# Patient Record
Sex: FEMALE
[De-identification: names, ages, dates, MRNs, and addresses within clinical notes are randomized per-mention and may not be internally consistent; named-entity substitution may affect disease eponyms.]

---

## 2022-07-19 ENCOUNTER — APPOINTMENT (OUTPATIENT)
Dept: ORTHOPEDIC SURGERY | Facility: CLINIC | Age: 82
End: 2022-07-19

## 2022-07-19 VITALS — BODY MASS INDEX: 23.78 KG/M2 | HEIGHT: 66 IN | WEIGHT: 148 LBS

## 2022-07-19 DIAGNOSIS — M17.12 UNILATERAL PRIMARY OSTEOARTHRITIS, LEFT KNEE: ICD-10-CM

## 2022-07-19 DIAGNOSIS — Z78.9 OTHER SPECIFIED HEALTH STATUS: ICD-10-CM

## 2022-07-19 DIAGNOSIS — E11.9 TYPE 2 DIABETES MELLITUS W/OUT COMPLICATIONS: ICD-10-CM

## 2022-07-19 DIAGNOSIS — M17.11 UNILATERAL PRIMARY OSTEOARTHRITIS, RIGHT KNEE: ICD-10-CM

## 2022-07-19 PROBLEM — Z00.00 ENCOUNTER FOR PREVENTIVE HEALTH EXAMINATION: Status: ACTIVE | Noted: 2022-07-19

## 2022-07-19 PROCEDURE — 20610 DRAIN/INJ JOINT/BURSA W/O US: CPT | Mod: 50

## 2022-07-19 PROCEDURE — 99203 OFFICE O/P NEW LOW 30 MIN: CPT | Mod: 25

## 2022-07-19 PROCEDURE — 73562 X-RAY EXAM OF KNEE 3: CPT | Mod: 50

## 2022-07-19 RX ORDER — DORZOLAMIDE HYDROCHLORIDE 20 MG/ML
2 SOLUTION OPHTHALMIC
Qty: 30 | Refills: 0 | Status: ACTIVE | COMMUNITY
Start: 2022-06-29

## 2022-07-19 RX ORDER — BIMATOPROST 0.1 MG/ML
0.01 SOLUTION/ DROPS OPHTHALMIC
Qty: 5 | Refills: 0 | Status: ACTIVE | COMMUNITY
Start: 2022-03-11

## 2022-07-19 RX ORDER — PREDNISOLONE ACETATE 10 MG/ML
1 SUSPENSION/ DROPS OPHTHALMIC
Qty: 5 | Refills: 0 | Status: ACTIVE | COMMUNITY
Start: 2022-06-08

## 2022-07-19 RX ORDER — BROMFENAC 0.76 MG/ML
0.07 SOLUTION/ DROPS OPHTHALMIC
Qty: 5 | Refills: 0 | Status: ACTIVE | COMMUNITY
Start: 2022-06-09

## 2022-07-19 RX ORDER — BRIMONIDINE TARTRATE 2 MG/MG
0.2 SOLUTION/ DROPS OPHTHALMIC
Qty: 30 | Refills: 0 | Status: ACTIVE | COMMUNITY
Start: 2022-03-15

## 2022-07-19 RX ORDER — PILOCARPINE HYDROCHLORIDE OPHTHALMIC SOLUTION 10 MG/ML
1 SOLUTION/ DROPS OPHTHALMIC
Qty: 15 | Refills: 0 | Status: ACTIVE | COMMUNITY
Start: 2022-02-17

## 2022-07-19 RX ORDER — PILOCARPINE HYDROCHLORIDE OPHTHALMIC SOLUTION 20 MG/ML
2 SOLUTION/ DROPS OPHTHALMIC
Qty: 15 | Refills: 0 | Status: ACTIVE | COMMUNITY
Start: 2022-03-17

## 2022-07-19 RX ORDER — ACETAZOLAMIDE 500 MG/1
500 CAPSULE ORAL
Qty: 60 | Refills: 0 | Status: ACTIVE | COMMUNITY
Start: 2022-01-25

## 2022-07-19 RX ORDER — BESIFLOXACIN 6 MG/ML
0.6 SUSPENSION OPHTHALMIC
Qty: 5 | Refills: 0 | Status: ACTIVE | COMMUNITY
Start: 2022-06-01

## 2022-07-19 RX ORDER — TROPICAMIDE 10 MG/ML
1 SOLUTION/ DROPS OPHTHALMIC
Qty: 15 | Refills: 0 | Status: ACTIVE | COMMUNITY
Start: 2022-06-08

## 2022-07-19 RX ORDER — DORZOLAMIDE HYDROCHLORIDE TIMOLOL MALEATE 20; 5 MG/ML; MG/ML
22.3-6.8 SOLUTION/ DROPS OPHTHALMIC
Qty: 10 | Refills: 0 | Status: ACTIVE | COMMUNITY
Start: 2022-07-01

## 2022-07-20 NOTE — IMAGING
[Bilateral] : knee bilaterally [advanced tricompartmental OA with medial compartment narrowing and varus alignment] : advanced tricompartmental OA with medial compartment narrowing and varus alignment [de-identified] : Bilateral knee exam: \par \par General: no distress, no ligamentous laxity\par Skin: no significant pertinent finding\par Inspection: \par    Effusion: min\par    Malalignment: varus\par    Swelling: (-)\par    Quad atrophy: (-)\par    J-sign: (-)\par ROM: \par    0 - 135 degrees of flexion.\par Tenderness: \par    MJLT: +\par    LJLT: +\par    Medial patellar facet tenderness: +\par    Lateral patellar facet tenderness: +\par    Crepitus: +\par    Patellar grind tenderness: +)\par    Patellar tendon: (-)\par Strength: 5/5 Q/H/TA/GS/EHL\par Neuro: In tact to light touch throughout, DTR's wnl\par Vascularity: Extremity warm and well perfused\par Gait: mildly antalgic\par \par

## 2022-07-20 NOTE — DISCUSSION/SUMMARY
[de-identified] : discused options. bilateral knee csi today. will submit for bilateral kne euflexxa. \par \par The patient was advised of the diagnosis.  The natural history of the pathology was explained in full. All questions were answered.  The risks and benefits of conservative and interventional treatment alternatives were explained to the patient\par \par  Large joint corticosteroid injection given:right knee\par \par Patient indicated for injection after trial of rest, OTC medications including aspirin, Ibuprofen, Aleve etc or prescription NSAIDS, and/or exercises at home and/ or physical therapy without satisfactory response.  Patient has symptoms including pain, swelling, and/or decreased mobility in the joint. The risks, benefits, and alternatives to corticosteroid injection were explained in full to the patient, including but not limited to infection, sepsis, bleeding, scarring, skin discoloration, temporary increase in pain, syncopal episode, failure to resolve symptoms, allergic reaction, symptom recurrence, and elevation of blood sugar in diabetics. Patient understood the risks. All questions were answered. After discussion of options, patient requested an injection. \par \par Oral informed consent was obtained and sterile technique was utilized for the procedure including the preparation of the solutions used for the injection and betadine followed by alcohol prep to the injection site. Anesthesia was given with ethyl chloride sprayed topically. The injection was delivered. Patient tolerated the procedure well. \par \par Post Procedure Instructions: Patient was advised to call if redness, pain, or fever occur and apply ice for 15 min on and 15 min off later today\par \par Medications delivered: Depomedrol: 40 mg, Lidocaine: 4 cc\par \par Large joint corticosteroid injection given: left knee\par \par Patient indicated for injection after trial of rest, OTC medications including aspirin, Ibuprofen, Aleve etc or prescription NSAIDS, and/or exercises at home and/ or physical therapy without satisfactory response.  Patient has symptoms including pain, swelling, and/or decreased mobility in the joint. The risks, benefits, and alternatives to corticosteroid injection were explained in full to the patient, including but not limited to infection, sepsis, bleeding, scarring, skin discoloration, temporary increase in pain, syncopal episode, failure to resolve symptoms, allergic reaction, symptom recurrence, and elevation of blood sugar in diabetics. Patient understood the risks. All questions were answered. After discussion of options, patient requested an injection. \par \par Oral informed consent was obtained and sterile technique was utilized for the procedure including the preparation of the solutions used for the injection and betadine followed by alcohol prep to the injection site. Anesthesia was given with ethyl chloride sprayed topically. The injection was delivered. Patient tolerated the procedure well. \par \par Post Procedure Instructions: Patient was advised to call if redness, pain, or fever occur and apply ice for 15 min on and 15 min off later today\par \par Medications delivered: Depomedrol: 40 mg, Lidocaine: 4 cc\par

## 2022-07-20 NOTE — HISTORY OF PRESENT ILLNESS
[Left Leg] : left leg [Right Leg] : right leg [Gradual] : gradual [10] : 10 [7] : 7 [Sharp] : sharp [Constant] : constant [Walking] : walking [de-identified] : This is Ms. SAV GARCIA  a 82 year old female who comes in today complaining of longstanding bilateral L>R knee pain.  She was seen at an urgent care in [place] in March and had xrays done. Motrin and topical creams to mild relief. Admits to increasing pain and difficulty with prolonged walking and stairs. Admits to occasionally feeling and hearing "cracking" when she walks.  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

## 2022-07-25 RX ORDER — HYALURONATE SODIUM 20 MG/2 ML
20 SYRINGE (ML) INTRAARTICULAR
Qty: 6 | Refills: 0 | Status: ACTIVE | COMMUNITY
Start: 2022-07-25 | End: 1900-01-01

## 2022-09-06 ENCOUNTER — NON-APPOINTMENT (OUTPATIENT)
Age: 82
End: 2022-09-06

## 2022-11-30 ENCOUNTER — OFFICE (OUTPATIENT)
Dept: URBAN - METROPOLITAN AREA CLINIC 109 | Facility: CLINIC | Age: 82
Setting detail: OPHTHALMOLOGY
End: 2022-11-30
Payer: MEDICARE

## 2022-11-30 DIAGNOSIS — H40.1133: ICD-10-CM

## 2022-11-30 DIAGNOSIS — H34.8320: ICD-10-CM

## 2022-11-30 PROCEDURE — 99213 OFFICE O/P EST LOW 20 MIN: CPT | Performed by: OPHTHALMOLOGY

## 2022-11-30 ASSESSMENT — CONFRONTATIONAL VISUAL FIELD TEST (CVF)
OS_FINDINGS: FULL
OD_FINDINGS: FULL

## 2022-11-30 ASSESSMENT — REFRACTION_MANIFEST
OD_SPHERE: +2.75
OD_AXIS: 90
OS_ADD: +3.00
OD_CYLINDER: -0.75
OD_SPHERE: PLANO
OD_CYLINDER: -1.00
OD_VA1: 20/NI
OD_ADD: +3.00
OS_SPHERE: +0.25
OD_AXIS: 82

## 2022-11-30 ASSESSMENT — REFRACTION_AUTOREFRACTION
OS_SPHERE: -0.75
OS_CYLINDER: -1.00
OS_AXIS: 060
OD_AXIS: 094
OD_SPHERE: PL
OD_CYLINDER: -0.50

## 2022-11-30 ASSESSMENT — REFRACTION_CURRENTRX
OS_OVR_VA: 20/
OS_SPHERE: +2.75
OS_ADD: +2.75
OD_SPHERE: +2.75
OD_ADD: +2.75
OD_OVR_VA: 20/
OS_AXIS: 83
OD_AXIS: 82
OS_CYLINDER: -0.50
OD_CYLINDER: -0.75

## 2022-11-30 ASSESSMENT — SPHEQUIV_DERIVED
OS_SPHEQUIV: -1.25
OD_SPHEQUIV: 2.375

## 2022-11-30 ASSESSMENT — TONOMETRY
OD_IOP_MMHG: 20
OS_IOP_MMHG: 13

## 2022-11-30 ASSESSMENT — VISUAL ACUITY
OD_BCVA: 20/50-1
OS_BCVA: 20/300

## 2023-02-27 ENCOUNTER — RX ONLY (RX ONLY)
Age: 83
End: 2023-02-27

## 2023-02-27 ENCOUNTER — OFFICE (OUTPATIENT)
Dept: URBAN - METROPOLITAN AREA CLINIC 109 | Facility: CLINIC | Age: 83
Setting detail: OPHTHALMOLOGY
End: 2023-02-27
Payer: MEDICARE

## 2023-02-27 DIAGNOSIS — H40.1133: ICD-10-CM

## 2023-02-27 DIAGNOSIS — H34.8320: ICD-10-CM

## 2023-02-27 PROCEDURE — 99214 OFFICE O/P EST MOD 30 MIN: CPT | Performed by: OPHTHALMOLOGY

## 2023-02-27 ASSESSMENT — KERATOMETRY
OS_K2POWER_DIOPTERS: 45.87
OD_K2POWER_DIOPTERS: 45.00
OD_K1POWER_DIOPTERS: 44.62
OS_K1POWER_DIOPTERS: 44.25
OS_AXISANGLE_DEGREES: 118
OD_AXISANGLE_DEGREES: 66

## 2023-02-27 ASSESSMENT — REFRACTION_MANIFEST
OD_ADD: +3.00
OS_SPHERE: +0.25
OD_CYLINDER: -1.00
OD_CYLINDER: -0.75
OD_SPHERE: +2.75
OD_AXIS: 82
OD_AXIS: 90
OS_ADD: +3.00
OD_VA1: 20/NI
OD_SPHERE: PLANO

## 2023-02-27 ASSESSMENT — REFRACTION_CURRENTRX
OD_OVR_VA: 20/
OS_CYLINDER: -0.50
OS_AXIS: 83
OD_AXIS: 82
OS_ADD: +2.75
OD_ADD: +2.75
OS_SPHERE: +2.75
OD_SPHERE: +2.75
OD_CYLINDER: -0.75
OS_OVR_VA: 20/

## 2023-02-27 ASSESSMENT — SPHEQUIV_DERIVED
OS_SPHEQUIV: -1.25
OD_SPHEQUIV: 2.375

## 2023-02-27 ASSESSMENT — VISUAL ACUITY
OD_BCVA: 20/70-2
OS_BCVA: 20/500

## 2023-02-27 ASSESSMENT — AXIALLENGTH_DERIVED
OS_AL: 23.5062
OD_AL: 22.2646

## 2023-02-27 ASSESSMENT — CONFRONTATIONAL VISUAL FIELD TEST (CVF)
OD_FINDINGS: FULL
OS_FINDINGS: FULL

## 2023-02-27 ASSESSMENT — REFRACTION_AUTOREFRACTION
OD_CYLINDER: -0.50
OD_AXIS: 094
OS_AXIS: 060
OS_CYLINDER: -1.00
OS_SPHERE: -0.75
OD_SPHERE: PL

## 2023-03-01 PROBLEM — R51.9 HEADACHE, UNSPECIFIED: Status: ACTIVE | Noted: 2023-02-27

## 2023-04-21 ENCOUNTER — APPOINTMENT (OUTPATIENT)
Dept: ORTHOPEDIC SURGERY | Facility: CLINIC | Age: 83
End: 2023-04-21

## 2023-11-29 ENCOUNTER — OFFICE (OUTPATIENT)
Dept: URBAN - METROPOLITAN AREA CLINIC 109 | Facility: CLINIC | Age: 83
Setting detail: OPHTHALMOLOGY
End: 2023-11-29
Payer: MEDICARE

## 2023-11-29 ENCOUNTER — RX ONLY (RX ONLY)
Age: 83
End: 2023-11-29

## 2023-11-29 DIAGNOSIS — H40.1133: ICD-10-CM

## 2023-11-29 DIAGNOSIS — H34.8320: ICD-10-CM

## 2023-11-29 PROCEDURE — 92133 CPTRZD OPH DX IMG PST SGM ON: CPT | Performed by: OPHTHALMOLOGY

## 2023-11-29 PROCEDURE — 92020 GONIOSCOPY: CPT | Performed by: OPHTHALMOLOGY

## 2023-11-29 PROCEDURE — 92083 EXTENDED VISUAL FIELD XM: CPT | Performed by: OPHTHALMOLOGY

## 2023-11-29 PROCEDURE — 92014 COMPRE OPH EXAM EST PT 1/>: CPT | Performed by: OPHTHALMOLOGY

## 2023-11-29 ASSESSMENT — REFRACTION_MANIFEST
OS_ADD: +3.00
OD_ADD: +3.00
OD_CYLINDER: -0.75
OD_SPHERE: +2.75
OD_AXIS: 90
OD_AXIS: 82
OS_SPHERE: +0.25
OD_CYLINDER: -1.00
OD_SPHERE: PLANO
OD_VA1: 20/NI

## 2023-11-29 ASSESSMENT — REFRACTION_CURRENTRX
OS_OVR_VA: 20/
OS_ADD: +2.75
OD_AXIS: 82
OD_SPHERE: +2.75
OS_CYLINDER: -0.50
OD_OVR_VA: 20/
OS_SPHERE: +2.75
OS_AXIS: 83
OD_CYLINDER: -0.75
OD_ADD: +2.75

## 2023-11-29 ASSESSMENT — REFRACTION_AUTOREFRACTION
OS_SPHERE: +1.00
OD_CYLINDER: -1.00
OD_AXIS: 104
OS_AXIS: 072
OD_SPHERE: +0.75
OS_CYLINDER: -1.75

## 2023-11-29 ASSESSMENT — CONFRONTATIONAL VISUAL FIELD TEST (CVF)
OD_FINDINGS: FULL
OS_FINDINGS: FULL

## 2023-11-29 ASSESSMENT — SPHEQUIV_DERIVED
OS_SPHEQUIV: 0.125
OD_SPHEQUIV: 2.375
OD_SPHEQUIV: 0.25

## 2024-05-14 ENCOUNTER — OFFICE (OUTPATIENT)
Dept: URBAN - METROPOLITAN AREA CLINIC 109 | Facility: CLINIC | Age: 84
Setting detail: OPHTHALMOLOGY
End: 2024-05-14
Payer: MEDICARE

## 2024-05-14 DIAGNOSIS — H40.1133: ICD-10-CM

## 2024-05-14 PROCEDURE — 92250 FUNDUS PHOTOGRAPHY W/I&R: CPT | Performed by: OPHTHALMOLOGY

## 2024-05-14 PROCEDURE — 99213 OFFICE O/P EST LOW 20 MIN: CPT | Performed by: OPHTHALMOLOGY

## 2024-05-14 ASSESSMENT — CONFRONTATIONAL VISUAL FIELD TEST (CVF)
OD_FINDINGS: FULL
OS_FINDINGS: FULL

## 2024-05-20 ENCOUNTER — OFFICE (OUTPATIENT)
Dept: URBAN - METROPOLITAN AREA CLINIC 115 | Facility: CLINIC | Age: 84
Setting detail: OPHTHALMOLOGY
End: 2024-05-20
Payer: MEDICARE

## 2024-05-20 DIAGNOSIS — E11.9: ICD-10-CM

## 2024-05-20 DIAGNOSIS — H34.8320: ICD-10-CM

## 2024-05-20 PROCEDURE — 92134 CPTRZ OPH DX IMG PST SGM RTA: CPT | Performed by: OPHTHALMOLOGY

## 2024-05-20 PROCEDURE — 67028 INJECTION EYE DRUG: CPT | Mod: LT | Performed by: OPHTHALMOLOGY

## 2024-05-20 PROCEDURE — 92202 OPSCPY EXTND ON/MAC DRAW: CPT | Performed by: OPHTHALMOLOGY

## 2024-05-20 PROCEDURE — 92014 COMPRE OPH EXAM EST PT 1/>: CPT | Mod: 25 | Performed by: OPHTHALMOLOGY

## 2024-05-20 ASSESSMENT — CONFRONTATIONAL VISUAL FIELD TEST (CVF)
OS_FINDINGS: FULL
OD_FINDINGS: FULL

## 2024-05-21 PROBLEM — H34.832 BRVO; LEFT EYE: Status: ACTIVE | Noted: 2024-05-20

## 2024-06-26 ENCOUNTER — OFFICE (OUTPATIENT)
Dept: URBAN - METROPOLITAN AREA CLINIC 88 | Facility: CLINIC | Age: 84
Setting detail: OPHTHALMOLOGY
End: 2024-06-26
Payer: MEDICARE

## 2024-06-26 DIAGNOSIS — H34.8320: ICD-10-CM

## 2024-06-26 DIAGNOSIS — E11.9: ICD-10-CM

## 2024-06-26 PROCEDURE — 67028 INJECTION EYE DRUG: CPT | Mod: LT | Performed by: OPHTHALMOLOGY

## 2024-06-26 PROCEDURE — 92134 CPTRZ OPH DX IMG PST SGM RTA: CPT | Performed by: OPHTHALMOLOGY

## 2024-06-26 PROCEDURE — 92012 INTRM OPH EXAM EST PATIENT: CPT | Mod: 25 | Performed by: OPHTHALMOLOGY

## 2024-06-26 ASSESSMENT — CONFRONTATIONAL VISUAL FIELD TEST (CVF)
OD_FINDINGS: FULL
OS_FINDINGS: FULL

## 2024-07-22 ENCOUNTER — OFFICE (OUTPATIENT)
Dept: URBAN - METROPOLITAN AREA CLINIC 88 | Facility: CLINIC | Age: 84
Setting detail: OPHTHALMOLOGY
End: 2024-07-22
Payer: MEDICARE

## 2024-07-22 DIAGNOSIS — H34.8320: ICD-10-CM

## 2024-07-22 PROCEDURE — 67228 TREATMENT X10SV RETINOPATHY: CPT | Mod: LT | Performed by: OPHTHALMOLOGY

## 2024-07-22 PROCEDURE — 92134 CPTRZ OPH DX IMG PST SGM RTA: CPT | Performed by: OPHTHALMOLOGY

## 2024-07-22 ASSESSMENT — CONFRONTATIONAL VISUAL FIELD TEST (CVF)
OS_FINDINGS: FULL
OD_FINDINGS: FULL

## 2024-09-11 ENCOUNTER — OFFICE (OUTPATIENT)
Dept: URBAN - METROPOLITAN AREA CLINIC 88 | Facility: CLINIC | Age: 84
Setting detail: OPHTHALMOLOGY
End: 2024-09-11
Payer: MEDICARE

## 2024-09-11 DIAGNOSIS — H34.8320: ICD-10-CM

## 2024-09-11 DIAGNOSIS — E11.9: ICD-10-CM

## 2024-09-11 PROCEDURE — 92235 FLUORESCEIN ANGRPH MLTIFRAME: CPT | Performed by: OPHTHALMOLOGY

## 2024-09-11 PROCEDURE — 92134 CPTRZ OPH DX IMG PST SGM RTA: CPT | Performed by: OPHTHALMOLOGY

## 2024-09-11 PROCEDURE — 92012 INTRM OPH EXAM EST PATIENT: CPT | Performed by: OPHTHALMOLOGY

## 2024-09-11 ASSESSMENT — CONFRONTATIONAL VISUAL FIELD TEST (CVF): OS_FINDINGS: FULL

## 2024-09-17 ENCOUNTER — OFFICE (OUTPATIENT)
Dept: URBAN - METROPOLITAN AREA CLINIC 109 | Facility: CLINIC | Age: 84
Setting detail: OPHTHALMOLOGY
End: 2024-09-17
Payer: MEDICARE

## 2024-09-17 DIAGNOSIS — H40.1133: ICD-10-CM

## 2024-09-17 PROCEDURE — 92133 CPTRZD OPH DX IMG PST SGM ON: CPT | Performed by: OPHTHALMOLOGY

## 2024-09-17 PROCEDURE — 99213 OFFICE O/P EST LOW 20 MIN: CPT | Performed by: OPHTHALMOLOGY

## 2024-09-17 ASSESSMENT — CONFRONTATIONAL VISUAL FIELD TEST (CVF)
OD_FINDINGS: FULL
OS_FINDINGS: FULL

## 2024-09-19 ENCOUNTER — NON-APPOINTMENT (OUTPATIENT)
Age: 84
End: 2024-09-19

## 2024-10-04 ENCOUNTER — RX ONLY (RX ONLY)
Age: 84
End: 2024-10-04

## 2024-10-04 ENCOUNTER — OFFICE (OUTPATIENT)
Dept: URBAN - METROPOLITAN AREA CLINIC 109 | Facility: CLINIC | Age: 84
Setting detail: OPHTHALMOLOGY
End: 2024-10-04
Payer: MEDICARE

## 2024-10-04 DIAGNOSIS — H40.1123: ICD-10-CM

## 2024-10-04 PROCEDURE — 65855 TRABECULOPLASTY LASER SURG: CPT | Mod: LT | Performed by: OPHTHALMOLOGY

## 2024-10-04 RX ORDER — PREDNISOLONE ACETATE 10 MG/ML
SUSPENSION/ DROPS OPHTHALMIC
Qty: 1 | Refills: 0 | Status: ACTIVE | OUTPATIENT

## 2024-10-04 ASSESSMENT — TONOMETRY: OS_IOP_MMHG: 11

## 2024-10-04 ASSESSMENT — KERATOMETRY
OD_AXISANGLE_DEGREES: 66
OD_K2POWER_DIOPTERS: 45.00
OS_K2POWER_DIOPTERS: 45.87
OS_AXISANGLE_DEGREES: 118
OS_K1POWER_DIOPTERS: 44.25
OD_K1POWER_DIOPTERS: 44.62

## 2024-10-04 ASSESSMENT — PACHYMETRY
OD_CT_CORRECTION: 6
OD_CT_UM: 465
OS_CT_UM: 496
OS_CT_CORRECTION: 4

## 2024-10-04 ASSESSMENT — REFRACTION_AUTOREFRACTION
OD_SPHERE: +0.50
OS_AXIS: 072
OD_CYLINDER: -1.00
OS_SPHERE: +0.75
OD_AXIS: 115
OS_CYLINDER: -1.50

## 2024-10-04 ASSESSMENT — VISUAL ACUITY
OS_BCVA: HM
OD_BCVA: 20/80

## 2024-11-01 ENCOUNTER — OFFICE (OUTPATIENT)
Dept: URBAN - METROPOLITAN AREA CLINIC 109 | Facility: CLINIC | Age: 84
Setting detail: OPHTHALMOLOGY
End: 2024-11-01
Payer: MEDICARE

## 2024-11-01 DIAGNOSIS — H40.1113: ICD-10-CM

## 2024-11-01 PROBLEM — H40.1123 GLAUCOMA-PRIMARY OPEN ANGLE; RIGHT EYE SEVERE, LEFT EYE SEVERE: Status: ACTIVE | Noted: 2024-10-04

## 2024-11-01 PROCEDURE — 65855 TRABECULOPLASTY LASER SURG: CPT | Mod: RT | Performed by: OPHTHALMOLOGY

## 2024-11-01 ASSESSMENT — KERATOMETRY
OS_K1POWER_DIOPTERS: 44.25
OD_K2POWER_DIOPTERS: 45.00
OS_K2POWER_DIOPTERS: 45.87
OD_AXISANGLE_DEGREES: 66
OS_AXISANGLE_DEGREES: 118
OD_K1POWER_DIOPTERS: 44.62

## 2024-11-01 ASSESSMENT — REFRACTION_AUTOREFRACTION
OD_AXIS: 115
OS_CYLINDER: -1.50
OD_SPHERE: +0.50
OS_AXIS: 072
OD_CYLINDER: -1.00
OS_SPHERE: +0.75

## 2024-11-01 ASSESSMENT — TONOMETRY: OD_IOP_MMHG: 11

## 2024-11-01 ASSESSMENT — PACHYMETRY
OS_CT_CORRECTION: 4
OD_CT_UM: 465
OD_CT_CORRECTION: 6
OS_CT_UM: 496

## 2024-11-01 ASSESSMENT — VISUAL ACUITY
OS_BCVA: HM
OD_BCVA: 20/80

## 2024-11-05 ENCOUNTER — OFFICE (OUTPATIENT)
Dept: URBAN - METROPOLITAN AREA CLINIC 88 | Facility: CLINIC | Age: 84
Setting detail: OPHTHALMOLOGY
End: 2024-11-05
Payer: MEDICARE

## 2024-11-05 DIAGNOSIS — H34.8320: ICD-10-CM

## 2024-11-05 PROCEDURE — 92134 CPTRZ OPH DX IMG PST SGM RTA: CPT | Performed by: OPHTHALMOLOGY

## 2024-11-05 PROCEDURE — 67028 INJECTION EYE DRUG: CPT | Mod: 79,LT | Performed by: OPHTHALMOLOGY

## 2024-11-05 ASSESSMENT — KERATOMETRY
OD_K2POWER_DIOPTERS: 45.00
OS_K1POWER_DIOPTERS: 44.25
OD_K1POWER_DIOPTERS: 44.62
OD_AXISANGLE_DEGREES: 66
OS_K2POWER_DIOPTERS: 45.87
OS_AXISANGLE_DEGREES: 118

## 2024-11-05 ASSESSMENT — PACHYMETRY
OD_CT_UM: 465
OS_CT_CORRECTION: 4
OS_CT_UM: 496
OD_CT_CORRECTION: 6

## 2024-11-05 ASSESSMENT — CONFRONTATIONAL VISUAL FIELD TEST (CVF)
OS_FINDINGS: FULL
OD_FINDINGS: FULL

## 2024-11-05 ASSESSMENT — REFRACTION_AUTOREFRACTION
OD_AXIS: 115
OD_SPHERE: +0.50
OD_CYLINDER: -1.00
OS_AXIS: 072
OS_SPHERE: +0.75
OS_CYLINDER: -1.50

## 2024-11-05 ASSESSMENT — TONOMETRY
OD_IOP_MMHG: 12
OS_IOP_MMHG: 10

## 2024-11-05 ASSESSMENT — VISUAL ACUITY
OD_BCVA: 20/100
OS_BCVA: HM

## 2025-02-18 ENCOUNTER — OFFICE (OUTPATIENT)
Dept: URBAN - METROPOLITAN AREA CLINIC 88 | Facility: CLINIC | Age: 85
Setting detail: OPHTHALMOLOGY
End: 2025-02-18
Payer: COMMERCIAL

## 2025-02-18 DIAGNOSIS — E11.9: ICD-10-CM

## 2025-02-18 DIAGNOSIS — H34.8320: ICD-10-CM

## 2025-02-18 PROCEDURE — 67028 INJECTION EYE DRUG: CPT | Mod: LT | Performed by: OPHTHALMOLOGY

## 2025-02-18 PROCEDURE — 92134 CPTRZ OPH DX IMG PST SGM RTA: CPT | Performed by: OPHTHALMOLOGY

## 2025-02-18 PROCEDURE — 99213 OFFICE O/P EST LOW 20 MIN: CPT | Mod: 25 | Performed by: OPHTHALMOLOGY

## 2025-02-18 ASSESSMENT — PACHYMETRY
OS_CT_UM: 496
OD_CT_CORRECTION: 6
OD_CT_UM: 465
OS_CT_CORRECTION: 4

## 2025-02-18 ASSESSMENT — KERATOMETRY
OD_AXISANGLE_DEGREES: 66
OS_K1POWER_DIOPTERS: 44.25
OD_K1POWER_DIOPTERS: 44.62
OS_K2POWER_DIOPTERS: 45.87
OS_AXISANGLE_DEGREES: 118
OD_K2POWER_DIOPTERS: 45.00

## 2025-02-18 ASSESSMENT — CONFRONTATIONAL VISUAL FIELD TEST (CVF)
OS_FINDINGS: FULL
OD_FINDINGS: FULL

## 2025-02-18 ASSESSMENT — VISUAL ACUITY
OD_BCVA: 20/300
OS_BCVA: HM

## 2025-02-18 ASSESSMENT — REFRACTION_AUTOREFRACTION
OS_CYLINDER: -1.50
OS_SPHERE: +0.75
OD_CYLINDER: -1.00
OS_AXIS: 072
OD_SPHERE: +0.50
OD_AXIS: 115

## 2025-02-18 ASSESSMENT — TONOMETRY
OD_IOP_MMHG: 15
OS_IOP_MMHG: 13

## 2025-04-15 ENCOUNTER — OFFICE (OUTPATIENT)
Dept: URBAN - METROPOLITAN AREA CLINIC 88 | Facility: CLINIC | Age: 85
Setting detail: OPHTHALMOLOGY
End: 2025-04-15
Payer: COMMERCIAL

## 2025-04-15 DIAGNOSIS — H34.8320: ICD-10-CM

## 2025-04-15 PROCEDURE — 67028 INJECTION EYE DRUG: CPT | Mod: LT | Performed by: OPHTHALMOLOGY

## 2025-04-15 PROCEDURE — 92134 CPTRZ OPH DX IMG PST SGM RTA: CPT | Performed by: OPHTHALMOLOGY

## 2025-04-15 ASSESSMENT — KERATOMETRY
OD_K1POWER_DIOPTERS: 44.62
OS_K2POWER_DIOPTERS: 45.87
OS_K1POWER_DIOPTERS: 44.25
OS_AXISANGLE_DEGREES: 118
OD_K2POWER_DIOPTERS: 45.00
OD_AXISANGLE_DEGREES: 66

## 2025-04-15 ASSESSMENT — PACHYMETRY
OS_CT_CORRECTION: 4
OD_CT_UM: 465
OS_CT_UM: 496
OD_CT_CORRECTION: 6

## 2025-04-15 ASSESSMENT — REFRACTION_AUTOREFRACTION
OD_CYLINDER: -1.00
OD_SPHERE: +0.50
OS_AXIS: 072
OS_SPHERE: +0.75
OS_CYLINDER: -1.50
OD_AXIS: 115

## 2025-04-15 ASSESSMENT — VISUAL ACUITY
OD_BCVA: 20/350
OS_BCVA: HM

## 2025-04-15 ASSESSMENT — TONOMETRY: OS_IOP_MMHG: 14

## 2025-04-15 ASSESSMENT — CONFRONTATIONAL VISUAL FIELD TEST (CVF)
OD_FINDINGS: FULL
OS_FINDINGS: FULL

## 2025-07-29 ENCOUNTER — OFFICE (OUTPATIENT)
Dept: URBAN - METROPOLITAN AREA CLINIC 88 | Facility: CLINIC | Age: 85
Setting detail: OPHTHALMOLOGY
End: 2025-07-29
Payer: COMMERCIAL

## 2025-07-29 DIAGNOSIS — H34.8320: ICD-10-CM

## 2025-07-29 PROCEDURE — 67028 INJECTION EYE DRUG: CPT | Mod: LT | Performed by: OPHTHALMOLOGY

## 2025-07-29 PROCEDURE — 92134 CPTRZ OPH DX IMG PST SGM RTA: CPT | Performed by: OPHTHALMOLOGY

## 2025-07-29 ASSESSMENT — CONFRONTATIONAL VISUAL FIELD TEST (CVF)
OS_FINDINGS: FULL
OD_FINDINGS: FULL

## 2025-07-29 ASSESSMENT — REFRACTION_AUTOREFRACTION
OS_CYLINDER: -1.50
OD_CYLINDER: -1.00
OD_SPHERE: +0.50
OD_AXIS: 115
OS_AXIS: 072
OS_SPHERE: +0.75

## 2025-07-29 ASSESSMENT — KERATOMETRY
OS_K1POWER_DIOPTERS: 44.25
OS_K2POWER_DIOPTERS: 45.87
OD_AXISANGLE_DEGREES: 66
OD_K1POWER_DIOPTERS: 44.62
OD_K2POWER_DIOPTERS: 45.00
OS_AXISANGLE_DEGREES: 118

## 2025-07-29 ASSESSMENT — PACHYMETRY
OD_CT_UM: 465
OS_CT_CORRECTION: 4
OS_CT_UM: 496
OD_CT_CORRECTION: 6

## 2025-07-29 ASSESSMENT — TONOMETRY: OS_IOP_MMHG: 13

## 2025-07-29 ASSESSMENT — VISUAL ACUITY
OS_BCVA: HM
OD_BCVA: 20/300